# Patient Record
Sex: FEMALE | Race: AMERICAN INDIAN OR ALASKA NATIVE
[De-identification: names, ages, dates, MRNs, and addresses within clinical notes are randomized per-mention and may not be internally consistent; named-entity substitution may affect disease eponyms.]

---

## 2018-04-25 ENCOUNTER — HOSPITAL ENCOUNTER (OUTPATIENT)
Dept: HOSPITAL 5 - CT | Age: 33
Discharge: HOME | End: 2018-04-25
Attending: INTERNAL MEDICINE
Payer: MEDICAID

## 2018-04-25 DIAGNOSIS — D64.9: ICD-10-CM

## 2018-04-25 DIAGNOSIS — I10: ICD-10-CM

## 2018-04-25 DIAGNOSIS — I31.3: Primary | ICD-10-CM

## 2018-04-25 DIAGNOSIS — R07.9: ICD-10-CM

## 2018-04-25 PROCEDURE — 71250 CT THORAX DX C-: CPT

## 2018-04-25 NOTE — CAT SCAN REPORT
CT CHEST WITHOUT CONTRAST:



HISTORY: Pericardial effusion.



COMPARISON: none.



TECHNIQUE: Helical CT in 1.25mm intervals without IV contrast.  

Sagittal and coronal reformatted images.  





FINDINGS:







Thyroid gland: Normal.



Tracheobronchial tree: Normal.



Esophagus: Normal.



Heart: Normal.



Pericardium: A small pericardial effusion is identified measuring up to 

9 mm in thickness along the free left ventricular wall. No obvious 

pericardial nodularity on noncontrast CT. No pericardial calcifications.



Mediastinum: Aberrant origin of the right subclavian artery is 

identified which passes posterior to the trachea and esophagus. No 

mediastinal mass or inflammation.



Lung Fields: Normal.



Pleural Spaces: Normal.



Musculoskeletal: Normal.





IMPRESSION: 

Small pericardial effusion as described.

Aberrant origin of the right subclavian artery

Normal lung parenchyma.